# Patient Record
Sex: FEMALE | Race: OTHER | Employment: UNEMPLOYED | URBAN - METROPOLITAN AREA
[De-identification: names, ages, dates, MRNs, and addresses within clinical notes are randomized per-mention and may not be internally consistent; named-entity substitution may affect disease eponyms.]

---

## 2020-04-09 PROBLEM — Z34.90 ENCOUNTER FOR INDUCTION OF LABOR: Status: ACTIVE | Noted: 2020-04-09

## 2022-03-20 PROBLEM — Z34.90 ENCOUNTER FOR INDUCTION OF LABOR: Status: ACTIVE | Noted: 2020-04-09

## 2022-07-19 ENCOUNTER — HOSPITAL ENCOUNTER (OUTPATIENT)
Dept: BARIATRICS/WEIGHT MGMT | Age: 37
Discharge: HOME | End: 2022-07-19

## 2022-07-19 VITALS — HEIGHT: 64 IN | BODY MASS INDEX: 44.25 KG/M2 | WEIGHT: 259.2 LBS

## 2022-07-19 NOTE — PROGRESS NOTES
Medical Weight Loss Multi-Disciplinary Program    Patient's Name: Amanda Martinez   Age: 40 y.o. YOB: 1985   Sex: female    Session # 1. Pt attended in-person class. Weight obtained in office. Date: 7/19/2022    Visit Vitals  Ht 5' 4\" (1.626 m)   Wt 117.6 kg (259 lb 3.2 oz)   BMI 44.49 kg/m²       Pounds Lost since last month: N/A Pounds Gained since last month: N/A    Seminar Weight (6/27/22): 255.2 lbs  Overall Pounds Lost: 0.0 lbs   Overall Pounds Gained: 4 lbs    Do you smoke? no    Alcohol intake:  Number of drinks per week: 0    Class Guidelines    Guidelines are reviewed with patient at the start of every class. 1. Patient understands that weight loss trial classes must be consecutive. Patient understands if they miss a class, it is their responsibility to contact me to reschedule class. I will reach out to patient after their first no show. 2.  Patient understands the expectations that weight maintenance/weight loss is expected during the classes. Failure to demonstrate changes may result in extension of weight loss trial, followed by returning to see the surgeon. Patient understands that they CANNOT gain any weight during the weight loss trial.  Gaining weight will result in extension of weight loss trial.  3. Patient is also instructed to complete their labwork, psychological evaluation visit, and any other tests that the surgeon has used while they are working on their weight loss trial.  4.  Patient was instructed to bring their packet of nutrition education materials to every class and appointment. Other Pertinent Information    Changes Made in past month: Pt states that she is drinking less soda and more water. Patient's plan for dietary &/or behavior changes in the upcoming month: none noted. Eating Habits and Behaviors      Today in class we reviewed the Key Diet Principles. Patient was encouraged to consume 3 meals each day, and meal timing was reviewed. Meal time behaviors that will help pt to be successful with their weight loss efforts were also discussed. We discussed the importance of drinking adequate amounts of fluids, recommending that patient consume a minimum of 64 oz of sugar-free, caffeine-free and carbonation-free fluids each day. Patient was encouraged to eliminate sugar-sweetened beverages such as sweet tea, fruit juice, fruit smoothies, flavored coffee drinks and regular sodas. During the weight loss trial, patient was encouraged to focus on eating protein-forward meals, with a daily goal of  grams protein. Patient was also advised to decrease carbohydrate intake to <100 g/d, choosing complex vs. simple carbohydrates in limited amounts. We also discussed limiting fat intake. Encouraged patient to follow the \"3-gram rule\" when choosing foods by looking for items containing <3 g of fat and sugar per serving. We reviewed meal planning guidelines and discussed appropriate meal and snack options. We also talked about appropriate protein drinks and patient was encouraged to start trying these, using them either for a meal replacement or a protein-rich snack. We reviewed the nutritional guidelines for selecting protein shakes. Pre-operative vitamin and mineral supplementation was reviewed. Patient was instructed to choose a chewable complete multivitamin with iron in NON-gummy form. Selection of probiotic was also reviewed. Patient's current diet habits include:  Patient is eating 3 meals and 2 snacks per day. Patient is not measuring portions out. Patient indicates they are eating out 3 times per week. This includes fast food, carry out, and sit down restaurants. Per dietary recall, pts diet has the following concerns: Pt is consuming a large number of carbohydrates and simple sugars . Pt has found 0 protein supplement shakes for use post-op in meeting their protein needs.     Physical Activity/Exercise    Comments: Patient is currently walking  for activity 30-60 min/d x 5 d/wk. We talked about recommended types of physical activity, including walking, swimming, cycling, or chair exercises. I also talked with patient about doing some strength training, which helps the metabolism, as well as strengthen muscles and bones. Patient's plan to incorporate more activity includes: \"1 hr per day going to the gym, walking daily. \"      Behavior Modification     Comments:  During today's class, we discussed planning & prepping meals while on vacation as behavior change tool to improve eating behaviors, promote weight loss and long-term weight maintenance, encourage a healthy relationship with food, and prevent chronic disease. Patient was directed to the handout on \"Being Healthy on Vacation\", for guidelines to use while on vacation and as a reminder to limit or avoid Alcohol, keep splurges small, use protein drinks when needed for protein needs and to help stave off hunger while out and about. Additionally, discussed meal planning tips in relation to where the pt is staying and how they are travelling. Goals: Work to increase to 3-4 small meals per day, with 1-2 planned snacks as needed. Recommend following the plate method for meal planning - focusing on lean protein, non-starchy vegetables, and measured amounts of complex carbohydrates. - Goal of  g protein and <100 g carbohydrates per day. - Select at least 2 DIFFERENT protein supplements that can be used for protein supplementation to meet goals pre- and post-operatively. - Practice Carbohydrate Counting and label reading.   - Follow 3 g rule for fat and sugar. 2. Slow down meals  - Chew each bite 25-35 times. - Aim for 20-30 min/meal.  3. Increase non-caloric fluids to 64 oz per day.   Eliminate caffeine, added sugar, carbonation, and straws.               - Continue to work to decrease sugar sweetened beverages - goal of calorie-free beverages only. - Must eliminate caffeine prior to surgery and avoid for ~6-8 weeks. - Practice 30:30 rule,  food and fluid intake. 4. Start activity regimen, work to increase ADLs. 5. Start Complete MVI and probiotic at least 30 days pre-op. Candidate for surgery (per RD): PENDING, Pt scheduled for nutrition education on 8/25/2022.

## 2022-07-25 ENCOUNTER — OFFICE VISIT (OUTPATIENT)
Dept: SURGERY | Age: 37
End: 2022-07-25
Payer: COMMERCIAL

## 2022-07-25 ENCOUNTER — HOSPITAL ENCOUNTER (OUTPATIENT)
Dept: LAB | Age: 37
Discharge: HOME | End: 2022-07-25
Payer: COMMERCIAL

## 2022-07-25 ENCOUNTER — HOSPITAL ENCOUNTER (OUTPATIENT)
Dept: NON INVASIVE DIAGNOSTICS | Age: 37
Discharge: HOME | End: 2022-07-25
Payer: COMMERCIAL

## 2022-07-25 VITALS
HEIGHT: 64 IN | TEMPERATURE: 97.5 F | BODY MASS INDEX: 44.37 KG/M2 | OXYGEN SATURATION: 94 % | HEART RATE: 79 BPM | RESPIRATION RATE: 16 BRPM | SYSTOLIC BLOOD PRESSURE: 122 MMHG | DIASTOLIC BLOOD PRESSURE: 68 MMHG | WEIGHT: 259.9 LBS

## 2022-07-25 DIAGNOSIS — M54.32 SCIATICA OF LEFT SIDE: ICD-10-CM

## 2022-07-25 DIAGNOSIS — E66.01 MORBID OBESITY WITH BODY MASS INDEX (BMI) OF 40.0 TO 49.9 (HCC): ICD-10-CM

## 2022-07-25 DIAGNOSIS — Z01.818 PRE-OP EVALUATION: ICD-10-CM

## 2022-07-25 DIAGNOSIS — Z78.9 USES BIRTH CONTROL: ICD-10-CM

## 2022-07-25 DIAGNOSIS — E66.01 MORBID OBESITY (HCC): Primary | ICD-10-CM

## 2022-07-25 DIAGNOSIS — K30 FUNCTIONAL DYSPEPSIA: ICD-10-CM

## 2022-07-25 DIAGNOSIS — L70.9 ADULT ACNE: ICD-10-CM

## 2022-07-25 DIAGNOSIS — K43.9 ABDOMINAL WALL HERNIA: ICD-10-CM

## 2022-07-25 DIAGNOSIS — E66.01 MORBID OBESITY (HCC): ICD-10-CM

## 2022-07-25 DIAGNOSIS — Z87.891 SMOKING HISTORY: ICD-10-CM

## 2022-07-25 PROBLEM — Z34.90 ENCOUNTER FOR INDUCTION OF LABOR: Status: RESOLVED | Noted: 2020-04-09 | Resolved: 2022-07-25

## 2022-07-25 LAB
ATRIAL RATE: 79 BPM
CALCULATED P AXIS, ECG09: 35 DEGREES
CALCULATED R AXIS, ECG10: 6 DEGREES
CALCULATED T AXIS, ECG11: 53 DEGREES
DIAGNOSIS, 93000: NORMAL
P-R INTERVAL, ECG05: 148 MS
Q-T INTERVAL, ECG07: 396 MS
QRS DURATION, ECG06: 86 MS
QTC CALCULATION (BEZET), ECG08: 454 MS
T4 FREE SERPL-MCNC: 1.1 NG/DL (ref 0.7–1.5)
TSH SERPL DL<=0.05 MIU/L-ACNC: 2.45 UIU/ML (ref 0.36–3.74)
VENTRICULAR RATE, ECG03: 79 BPM

## 2022-07-25 PROCEDURE — 99204 OFFICE O/P NEW MOD 45 MIN: CPT | Performed by: SPECIALIST

## 2022-07-25 PROCEDURE — 36415 COLL VENOUS BLD VENIPUNCTURE: CPT

## 2022-07-25 PROCEDURE — 84443 ASSAY THYROID STIM HORMONE: CPT

## 2022-07-25 PROCEDURE — 84439 ASSAY OF FREE THYROXINE: CPT

## 2022-07-25 PROCEDURE — 93005 ELECTROCARDIOGRAM TRACING: CPT

## 2022-07-25 RX ORDER — SPIRONOLACTONE 100 MG/1
100 TABLET, FILM COATED ORAL DAILY
COMMUNITY
Start: 2022-07-05

## 2022-07-25 RX ORDER — KETOCONAZOLE 20 MG/ML
SHAMPOO TOPICAL
COMMUNITY
Start: 2022-07-05

## 2022-07-25 RX ORDER — NORETHINDRONE ACETATE AND ETHINYL ESTRADIOL, ETHINYL ESTRADIOL AND FERROUS FUMARATE 1MG-10(24)
1 KIT ORAL DAILY
COMMUNITY
Start: 2022-06-05

## 2022-07-25 NOTE — PROGRESS NOTES
Bariatric Surgery Consultation    Subjective: The patient is a 40 y.o. obese female with a Body mass index is 44.61 kg/m². .  The patient is currently her heaviest weight for the past several years. she has been overweight since her teen years. she has been considering surgery since last year. she desires surgery at this time because of multiple health concerns and their lifestyle issues which are hindered by their weight. she has been referred by Dr. Marce Paul for evaluation and treatment of their obesity via surgical intervention. Janes Ivy has tried multiple diets in her lifetime most recently tried behavior modification and unsupervised diets    Bariatric comorbidities present are   Patient Active Problem List   Diagnosis Code    Encounter for induction of labor Z34.90    Morbid obesity ([facility] [address] E66.01    Uses birth control Z78.9    Adult acne L70.9    Functional dyspepsia K30    Sciatica of left side M54.32    Abdominal wall hernia K43.9    Smoking history Z87.891    Morbid obesity with body mass index (BMI) of 40.0 to 49.9 ([facility]) E66.01       The patient is considering laparoscopic sleeve gastrectomy for surgical weight loss due to their ineffective progress with medical forms of weight loss and the urging of their physician who cares for their primary medical issues. The patient  now presents  for consideration for weight loss surgery understanding the benefits of this over a medical approach of weight loss as was discussed in our presentation on weight loss surgery. They have discussed their plans both with their family and primary care physician who is in support of their pursuit of such. The patient has had no health issues as of late and denies and gastrointestinal disturbances other than what is outlined below in their review of symptoms.  All of their prior evaluations available by both their PCP's and specialists physicians have been reviewed today either in the Care Everywhere portal or scanned under the media tab. I have spent a large portion of my initial consultation today reviewing the patients current dietary habits which have contributed to their health issues and obesity. I have suggested to them personally a dietary regimen that they can initiate now to help with their status as it pertains to their weight. They understand that the most important aspect of their journey through their weight loss endeavor will be their adherence to a new lifestyle of healthy eating behavior. They also understand that an adherence to an exercise program will not only help with weight loss but is ultimately important in weight maintenance. The patients goal weight is 157 lb. Patient Active Problem List    Diagnosis Date Noted    Morbid obesity ([facility] [address] 2022    Uses birth control 2022    Adult acne 2022    Functional dyspepsia 2022    Sciatica of left side 2022    Abdominal wall hernia 2022    Smoking history 2022    Morbid obesity with body mass index (BMI) of 40.0 to 49.9 ([address] 2022    Encounter for induction of labor 2020     Past Surgical History:   Procedure Laterality Date    HX LEEP PROCEDURE        Social History     Tobacco Use    Smoking status: Former     Types: Cigarettes     Quit date:      Years since quittin.5    Smokeless tobacco: Never   Substance Use Topics    Alcohol use: Yes     Comment: occasional glass of wine      Family History   Problem Relation Age of Onset    Hypertension Mother     No Known Problems Father     Diabetes Paternal Grandfather       Current Outpatient Medications   Medication Sig Dispense Refill    ketoconazole (NIZORAL) 2 % shampoo use as directed three times a week      Lo Loestrin Fe 1 mg-10 mcg (24)/10 mcg (2) tab Take 1 Tablet by mouth daily. spironolactone (ALDACTONE) 100 mg tablet Take 100 mg by mouth in the morning.       ibuprofen (MOTRIN) 800 mg tablet Take 1 Tab by mouth every eight (8) hours as needed for Pain. 30 Tab 0    multivitamin (ONE A DAY) tablet Take 1 Tab by mouth daily. cholecalciferol, vitamin D3, 50 mcg (2,000 unit) tab Take  by mouth daily. loratadine (CLARITIN) 10 mg tablet Take 10 mg by mouth daily.        No Known Allergies     Review of Systems:     General - No history or complaints of unexpected fever, chills, or weight loss  Head/Neck - No history or complaints of headache, diplopia, dysphagia, hearing loss  Cardiac - No history or complaints of chest pain, palpitations, murmur, or shortness of breath  Pulmonary - No history or complaints of shortness of breath, productive cough, hemoptysis  Gastrointestinal - (+) reflux, no abdominal pain, obstipation/constipation or blood per rectum  Genitourinary - No history or complaints of hematuria/dysuria, stress urinary incontinence symptoms, or renal lithiasis  Musculoskeletal - mild joint pain in their hips,  no muscular weakness  Hematologic - No history or complaints of bleeding disorders,  No blood transfusions  Neurologic - No history or complaints of  migraine headaches, seizure activity, syncopal episodes, TIA or stroke  Integumentary - No history or complaints of rashes, abnormal nevi, skin cancer  Gynecological - unremarkable    Objective:     Visit Vitals  /68 (BP 1 Location: Right upper arm, BP Patient Position: Sitting, BP Cuff Size: Large adult long)   Pulse 79   Temp 97.5 °F (36.4 °C)   Resp 16   Ht 5' 4\" (1.626 m)   Wt 117.9 kg (259 lb 14.4 oz)   SpO2 94%   BMI 44.61 kg/m²     Physical Examination: General appearance - alert, well appearing, and in no distress  Mental status - alert, oriented to person, place, and time  Eyes - pupils equal and reactive, extraocular eye movements intact  Nose - normal and patent, no erythema, discharge or polyps  Mouth - mucous membranes moist, pharynx normal without lesions  Neck - supple, no significant adenopathy  Lymphatics - no palpable lymphadenopathy, no hepatosplenomegaly  Chest - clear to auscultation, no wheezes, rales or rhonchi, symmetric air entry  Heart - normal rate, regular rhythm, normal S1, S2, no murmurs, rubs, clicks or gallops  Abdomen - soft, nontender, nondistended, no masses or organomegaly,   moderately large umbilical hernia slightly asymmetric with bulge to the right  Back exam - full range of motion, no tenderness, palpable spasm or pain on motion  Neurological - alert, oriented, normal speech, no focal findings or movement disorder noted  Musculoskeletal - no joint tenderness, deformity or swelling  Extremities - peripheral pulses normal, no pedal edema, no clubbing or cyanosis  Skin - normal coloration and turgor, no rashes, no suspicious skin lesions noted    Labs:       Recent Results (from the past 1440 hour(s))   CBC WITH AUTOMATED DIFF    Collection Time: 06/29/22 12:19 PM   Result Value Ref Range    WBC 6.6 4.0 - 11.0 1000/mm3    RBC 4.36 3.60 - 5.20 M/uL    HGB 13.6 13.0 - 17.2 gm/dl    HCT 41.8 37.0 - 50.0 %    MCV 95.9 80.0 - 98.0 fL    MCH 31.2 25.4 - 34.6 pg    MCHC 32.5 30.0 - 36.0 gm/dl    PLATELET 563 762 - 380 1000/mm3    MPV 10.9 (H) 6.0 - 10.0 fL    RDW-SD 41.4 36.4 - 46.3      NRBC 0 0 - 0      IMMATURE GRANULOCYTES 0.3 0.0 - 3.0 %    NEUTROPHILS 53.2 34 - 64 %    LYMPHOCYTES 32.2 28 - 48 %    MONOCYTES 7.6 1 - 13 %    EOSINOPHILS 5.9 (H) 0 - 5 %    BASOPHILS 0.8 0 - 3 %   METABOLIC PANEL, COMPREHENSIVE    Collection Time: 06/29/22 12:19 PM   Result Value Ref Range    Potassium 4.0 3.4 - 4.5 mEq/L    Chloride 102 98 - 107 mEq/L    Sodium 138 136 - 145 mEq/L    CO2 27 20 - 31 mEq/L    Glucose 85 74 - 106 mg/dl    BUN 10 9 - 23 mg/dl    Creatinine 0.62 0.55 - 1.02 mg/dl    GFR est AA >60.0      GFR est non-AA >60      Calcium 9.1 8.7 - 10.4 mg/dl    Anion gap 9 5 - 15 mmol/L    AST (SGOT) 11.0 0.0 - 33.9 U/L    ALT (SGPT) 11 10 - 49 U/L    Alk.  phosphatase 74 46 - 116 U/L    Bilirubin, total 0.40 0.30 - 1.20 mg/dl    Protein, total 7.3 5.7 - 8.2 gm/dl    Albumin 3.9 3.4 - 5.0 gm/dl   LIPID PANEL    Collection Time: 06/29/22 12:19 PM   Result Value Ref Range    Cholesterol, total 144 0 - 199 mg/dl    HDL Cholesterol 41 40 - 60 mg/dl    Triglyceride 83 0 - 150 mg/dl    LDL, calculated 86 0 - 130 mg/dl    CHOL/HDL Ratio 3.5 0.0 - 4.4 Ratio   URINALYSIS W/ RFLX MICROSCOPIC    Collection Time: 06/29/22 12:19 PM   Result Value Ref Range    Color YELLOW YELLOW,STRAW      Appearance SLIGHTLY CLOUDY (A) CLEAR      Glucose NEGATIVE NEGATIVE,Negative mg/dl    Bilirubin NEGATIVE NEGATIVE,Negative      Ketone 15 (A) NEGATIVE,Negative mg/dl    Specific gravity 1.010 1.005 - 1.030      Blood MODERATE (A) NEGATIVE,Negative      pH (UA) 6.5 5.0 - 9.0      Protein NEGATIVE NEGATIVE,Negative mg/dl    Urobilinogen 1.0 0.0 - 1.0 mg/dl    Nitrites NEGATIVE NEGATIVE,Negative      Leukocyte Esterase MODERATE (A) NEGATIVE,Negative     POC URINE MICROSCOPIC    Collection Time: 06/29/22 12:19 PM   Result Value Ref Range    Epithelial cells, squamous OCCASIONAL /LPF    WBC 15-29 /HPF       Assessment:     Morbid obesity with comorbidity    Plan:     laparoscopic sleeve gastrectomy    This is a 40 y.o. female with a BMI of Body mass index is 44.61 kg/m². and the weight-related co-morbidties as noted below. Sarah meets the NIH criteria for bariatric surgery based upon the BMI of Body mass index is 44.61 kg/m². and multiple weight-related co-morbidties. Rui Iniguez has elected laparoscopic sleeve gastrectomy as her intervention of choice for treatment of morbid obestiy through surgical means secondary to its safety profile, rapid return to work  and decreases in operative risks over gastric bypass. In the office today, following Sarah's history and physical examination, a 30 minute discussion regarding the anatomic alterations for the laparoscopic sleeve gastrectomy was undertaken.  The dietary expectations and the patient and physician dependent factors for success were thoroughly discussed, to include the need for interval follow-up and long-term dietary changes associated with success. The possible complications of the sleeve gastrectomy  were also discussed, to include;death, DVT/PE, staple line leak, bleeding, stricture formation, infection, nutritional deficiencies and sleeve dilation. Specific weight related outcomes for success were also discussed with an emphasis on careful and close follow-up with the first year and eating behavior modification as the baseline and cyclical hunger return. The patient expressed an understanding of the above factors, and her questions were answered in their entirety. In addition, the patient attended a 1.5 hour power point seminar regarding obesity, surgical weight loss including, adjustable gastric band, gastric bypass, and sleeve gastrectomy. This discussion contrasted the different surgical techniques, mechanisms of actions and expected outcomes, and surgical and medical risks associated with each procedure. During this seminar, there was a long question and answer session where each questions was answered until there were no additional questions. Today, the patient had all of her questions answered and desires to proceed with  bariatric surgery initially choosing sleeve gastrectomy as her surgical option. Partial consult labs and EKG ordered today    Secondary Diagnoses:     Dietary Intervention  - The patient is currently scheduled to see or has been followed by a bariatric nutritionist for an attempt at preoperative weight loss as has been dictated by their insurance carrier. They will be assessed at various times during their follow up to evaluate their progress depending on the length of time that is required once again by their carrier.   I have explained the importance of preoperative weight loss and the benefits regarding lower surgical risk and also assisting the patient in reaching their weight loss goal. Finally they understand their is a physiologic benefit from the standpoint of hepatic volume reduction preoperatively. I have reiterated the importance of a low carbohydrate and high protein regimen to achieve their stated goal.     GERD -The patient understands that weight loss surgery is not a guaranteed cure for reflux disease but does understand the benefits that weight loss can have on reflux disease. They also understand that at the time of surgery the gastroesophageal junction will be evaluated for the presence of a diaphragmatic hernia. Hernias will be corrected always with the gastric band and sleeve gastrectomy procedures, but only on a case by case basis with the gastric bypass if it prevents our ability to perform the operation at hand, or if I feel that they would benefit long term with correction of this issue. The patient also understands that neither weight loss surgery nor repair of a diaphragmatic hernia repair guarantees the complete cessation of the disease. They also understand there is a possibility of recurrence with a simple crural repair as is performed with these procedures. They understand they may have to continue their medications in the postoperative period. They have a good understanding that the gastric bypass procedure is better suited to total resolution of this issue and that neither the Lap Band nor sleeve gastrectomy is considered a curative procedure as it pertains to this diagnosis.     Signed By: León Frederick MD     July 25, 2022

## 2022-09-14 ENCOUNTER — APPOINTMENT (OUTPATIENT)
Dept: GENERAL RADIOLOGY | Age: 37
End: 2022-09-14
Attending: SPECIALIST
Payer: COMMERCIAL

## 2022-09-14 ENCOUNTER — HOSPITAL ENCOUNTER (OUTPATIENT)
Age: 37
Setting detail: OUTPATIENT SURGERY
Discharge: HOME | End: 2022-09-14
Attending: SPECIALIST | Admitting: SPECIALIST
Payer: COMMERCIAL

## 2022-09-14 ENCOUNTER — APPOINTMENT (OUTPATIENT)
Dept: SURGERY | Age: 37
End: 2022-09-14

## 2022-09-14 VITALS
BODY MASS INDEX: 42.53 KG/M2 | RESPIRATION RATE: 17 BRPM | HEIGHT: 64 IN | OXYGEN SATURATION: 98 % | SYSTOLIC BLOOD PRESSURE: 124 MMHG | TEMPERATURE: 98.4 F | WEIGHT: 249.1 LBS | DIASTOLIC BLOOD PRESSURE: 79 MMHG | HEART RATE: 76 BPM

## 2022-09-14 DIAGNOSIS — K30 FUNCTIONAL DYSPEPSIA: Primary | ICD-10-CM

## 2022-09-14 DIAGNOSIS — E66.01 MORBID OBESITY (HCC): ICD-10-CM

## 2022-09-14 PROCEDURE — 74240 X-RAY XM UPR GI TRC 1CNTRST: CPT | Performed by: SPECIALIST

## 2022-09-14 PROCEDURE — 74240 X-RAY XM UPR GI TRC 1CNTRST: CPT

## 2022-09-14 PROCEDURE — 76040000019: Performed by: SPECIALIST

## 2022-09-14 PROCEDURE — 74011000250 HC RX REV CODE- 250: Performed by: SPECIALIST

## 2022-09-14 NOTE — PROCEDURES
Patient:Sarah Whitlock   : 1985  Medical Record [id number]      PREPROCEDURE DIAGNOSIS: This patient is preoperative for laparoscopic sleeve gastrectomy procedure with a history of  reflux disease. POSTPROCEDURE DIAGNOSIS: This patient is preoperative for laparoscopic sleeve gastrectomy procedure with a history of  reflux disease. PROCEDURES PERFORMED: Upper GI study with barium. ESTIMATED BLOOD LOSS: None. SPECIMENS: None. STATEMENT OF MEDICAL NECESSITY: The patient is a patient with a  longstanding history of obesity. They are now considering the laparoscopic sleeve gastrectomy procedure as a means of surgical weight control and due to their history of reflux disease and are being assessed preoperatively for such. DESCRIPTION OF PROCEDURE: The patient was brought to the fluoroscopy unit and  was given thin barium. On swallowing of barium, they were noted to have  normal peristalsis of their esophagus. They had prompt filling of distal  esophagus with tapering into the gastroesophageal junction. There was no evidence of a hiatal hernia present. Contrast then filled the gastric cardia, fundus,body and pre pyloric region with no abnormalities noted. Contrast then exited the pylorus in normal fashion. No obstruction was noted. There was no evidence of reflux noted.     (normal anatomy)    Julian Frias MD

## 2022-09-21 ENCOUNTER — HOSPITAL ENCOUNTER (OUTPATIENT)
Dept: BARIATRICS/WEIGHT MGMT | Age: 37
Discharge: HOME | End: 2022-09-21

## 2022-09-21 VITALS — BODY MASS INDEX: 43.26 KG/M2 | WEIGHT: 253.4 LBS | HEIGHT: 64 IN

## 2022-09-21 NOTE — PROGRESS NOTES
Medical Weight Loss Multi-Disciplinary Program    Patient's Name: Luke Rowe Age: 40 y.o. YOB: 1985 Sex: female     Session #1 (RESTART). Pt attended in-person class. Weight obtained in office. Date: 9/21/2022    Visit Vitals  Ht 5' 4\" (1.626 m)   Wt 114.9 kg (253 lb 6.4 oz)   BMI 43.50 kg/m²       Pounds Lost since last month: N/A Pounds Gained since last month: N/A       Starting Weight (Initial consult - 7/25/22: 259.9 lbs Previous Months Weight: N/A   Overall Pounds Lost: 6.5 lbs  Overall Pounds Gained: 0 lbs      Do you smoke? No    Alcohol intake:  Number of drinks per week: \"occasionally\"    Class Guidelines    Guidelines are reviewed with patient at the start of every class. 1. Patient understands that weight loss trial classes must be consecutive. Patient understands if they miss a class, it is their responsibility to contact me to reschedule class. I will reach out to patient after their first no show. 2.  Patient understands the expectations that weight maintenance/weight loss is expected during the classes. Failure to demonstrate changes may result in extension of weight loss trial, followed by returning to see the surgeon. Patient understands that they CANNOT gain any weight during the weight loss trial.  Gaining weight will result in extension of weight loss trial.  3. Patient is also instructed to complete their labwork, psychological evaluation visit, and any other tests that the surgeon has ordered while they are working on their weight loss trial.  4.  Patient was instructed to bring their packet of nutrition education materials to every class and appointment. Eating Habits and Behaviors    Reviewed intake  Understanding low carbohydrates, low sugar, higher protein meals  Instruction given for personal dietary changes  Discussed perceived compliance    Comments: RD Reviewed Diet History and Physical Activity/Exercise habits.   Recommended dietary changes discussed for both before and after surgery. Today in class we reviewed the Key Diet Principles. Patient was encouraged to consume 3 meals each day, and meal timing was reviewed. Meal time behaviors that will help pt to be successful with their weight loss efforts were also discussed. We discussed the importance of drinking adequate amounts of fluids, recommending that patient consume a minimum of 64 oz of sugar-free, caffeine-free and carbonation-free fluids each day. Patient was encouraged to eliminate sugar-sweetened beverages such as sweet tea, fruit juice, fruit smoothies, flavored coffee drinks and regular sodas. During the weight loss trial, patient is encouraged to focus on eating protein-forward meals, with a daily goal of  grams protein. Patient was also advised to decrease carbohydrate intake to <100 g/d, choosing complex vs. simple carbohydrates in limited amounts. We also discussed limiting fat intake. Encouraged patient to follow the \"3-gram rule\" when choosing foods by looking for items containing <3 g of fat and sugar per serving. We reviewed meal planning guidelines and discussed appropriate meal and snack options. We also talked about appropriate protein drinks and patient was encouraged to start trying these, using them either for a meal replacement or a protein-rich snack pre-operatively. We reviewed the nutritional guidelines for selecting protein shakes. Pre-operative vitamin and mineral supplementation was reviewed. Patient was instructed to choose a chewable complete multivitamin with iron in NON-gummy form. Selection of probiotic was also reviewed. Comments: During today's class we talked about the role of carbohydrates. Patient was instructed on: The function of carbohydrates. Foods that are carbohydrate-heavy. Patient was given the guidelines to keep their carbohydrates less than 50-75 grams per day in the pre-op phase.   Patient was also given ideas of low carb swaps, which include zucchini noodles, spaghetti squash, or cauliflower rice. Discussed lower carb swaps to use instead of potato chips. Patient's current diet habits include:  Patient is eating 3 meals and 1-2 snacks per day. Per dietary recall, pts diet has the following concerns:  Pt is consuming foods high in carbohydrates, such as: fruit, rice or other starches in TV dinners  Pt is consuming 8 oz sugar-sweetened beverages/day. Pt has found 0 protein supplement shakes for use post-op in meeting their protein needs. Physical Activity/Exercise    Comments: We talked about exercise. Patient was given reasons of why exercise is so important and how that can help with their long-term success. I have encouraged patient to get a support system to help with the activity. We talked about recommended types of physical activity, including walking, swimming, cycling, or chair exercises. I also talked with patient about doing some strength training, which helps the metabolism, as well as strengthen muscles and bones. Patient's plan to incorporate more activity includes: \"More walking. I'm also in physical therapy 2x/wk. \"      Behavior Modification     Comments:  During today's class, we discussed the benefits of regular physical activity. Specific guidelines for cardiovascular exercise and resistance/strength training were reviewed, as well as appropriate types of physical activity. Patient was directed to the handout, \"Overcoming Barriers to Exercise\", where we reviewed the importance of making physical activity part of a healthy lifestyle rather than just a way of meeting a weight loss goal.  We also discussed  for specific tips on fitting physical activity in when patients feel they are too busy to exercise and during inclement weather, as well as ideas for low/no cost exercise, and ways to exercise despite physical limitations.   Patient was instructed to discuss any physical limitations with their healthcare provider. A list of ways to facilitate regular physical activity was reviewed, as well as how to get started with a regular physical activity regimen. Goals: Work to increase to 3-4 small meals per day, with 1-2 planned snacks as needed. Recommend following the plate method for meal planning - focusing on lean protein, non-starchy vegetables, and measured amounts of complex carbohydrates. - Goal of  g protein and <100 g carbohydrates per day. - Select at least 2 DIFFERENT protein supplements that can be used for protein supplementation to meet goals pre- and post-operatively. - Practice Carbohydrate Counting and label reading.   - Follow 3 g rule for fat and sugar. 2. Slow down meals  - Chew each bite 25-35 times. - Aim for 20-30 min/meal.  3. Increase non-caloric fluids to 64 oz per day. Eliminate caffeine, added sugar, carbonation, and straws.               - Continue to work to decrease sugar sweetened beverages - goal of calorie-free beverages only. - Must eliminate caffeine prior to surgery and avoid for ~6-8 weeks. - Practice 30:30 rule,  food and fluid intake. 4. Start activity regimen, work to increase ADLs. 5. Start Complete MVI and probiotic at least 30 days pre-op. Candidate for surgery (per RD): PENDING, Pt scheduled for nutrition education on 10/25/22.

## 2022-10-25 ENCOUNTER — HOSPITAL ENCOUNTER (OUTPATIENT)
Dept: BARIATRICS/WEIGHT MGMT | Age: 37
Discharge: HOME | End: 2022-10-25

## 2022-10-25 VITALS — WEIGHT: 254.5 LBS | HEIGHT: 64 IN | BODY MASS INDEX: 43.45 KG/M2

## 2022-10-25 NOTE — PROGRESS NOTES
Medical Weight Loss Multi-Disciplinary Program    Patient's Name: Jolanta Ansari Age: 40 y.o. YOB: 1985 Sex: female     Session #2. Pt attended in-person class. Weight obtained in office. Date: 10/25/2022    Visit Vitals  Ht 5' 4\" (1.626 m)   Wt 115.4 kg (254 lb 8 oz)   BMI 43.68 kg/m²       Pounds Lost since last month: 0.0 lbs Pounds Gained since last month: 1.1 lbs       Starting Weight: 259.9 lbs Previous Months Weight: 253.4 lbs   Overall Pounds Lost: 5.4 lbs  Overall Pounds Gained: 0.0 lbs     Do you smoke? no    Alcohol intake:  Number of drinks per week: 1    Class Guidelines    Guidelines are reviewed with patient at the start of every class. 1. Patient understands that weight loss trial classes must be consecutive. Patient understands if they miss a class, it is their responsibility to contact me to reschedule class. I will reach out to patient after their first no show. 2.  Patient understands the expectations that weight maintenance/weight loss is expected during the classes. Failure to demonstrate changes may result in extension of weight loss trial, followed by returning to see the surgeon. Patient understands that they CANNOT gain any weight during the weight loss trial.  Gaining weight will result in extension of weight loss trial.  3. Patient is also instructed to complete their labwork, psychological evaluation visit, and any other tests that the surgeon has ordered while they are working on their weight loss trial.  4.  Patient was instructed to bring their packet of nutrition education materials to every class and appointment. Eating Habits and Behaviors    Reviewed intake  Understanding low-carbohydrate/low-sugar/low-fat, higher protein meals  Instruction given for personal dietary changes  Discussed perceived compliance    Comments: RD Reviewed Diet History and Physical Activity/Exercise habits.   Recommended dietary changes discussed for both before and after surgery. Today in class we reviewed the Key Diet Principles. Patient was encouraged to consume 3 meals each day, and meal timing was reviewed. Meal time behaviors that will help pt to be successful with their weight loss efforts were also discussed. We discussed the importance of drinking adequate amounts of fluids, recommending that patient consume a minimum of 64 oz of sugar-free, caffeine-free and carbonation-free fluids each day. Patient was encouraged to eliminate sugar-sweetened beverages such as sweet tea, fruit juice, fruit smoothies, flavored coffee drinks and regular sodas. During the weight loss trial, patient is encouraged to focus on eating protein-forward meals, with a daily goal of  grams protein. Patient was also advised to decrease carbohydrate intake to <100 g/d, choosing complex vs. simple carbohydrates in limited amounts. We also discussed limiting fat intake. Encouraged patient to follow the \"3-gram rule\" when choosing foods by looking for items containing <3 g of fat and sugar per serving. We reviewed meal planning guidelines and discussed appropriate meal and snack options. We also talked about appropriate protein drinks and patient was encouraged to start trying these, using them either for a meal replacement or a protein-rich snack pre-operatively. We reviewed the nutritional guidelines for selecting protein shakes. Pre-operative vitamin and mineral supplementation was reviewed. Patient was instructed to choose a chewable complete multivitamin with iron in NON-gummy form. Selection of probiotic was also reviewed. We also talked about dining out after bariatric surgery. Patient was instructed on:   Following the Key Diet Principles to stay within the bariatric dietary guidelines  Key words to look for in menu item descriptions in order to make healthier choices  Requesting specific substitutions to allow meals to fit in with bariatric dietary guidelines  Using the restaurant card to request smaller portions of menu items or ordering from children's/senior's menu    Patient's current diet habits include:  Patient is eating 3 meals and 2 snacks per day. Per dietary recall, pts diet has the following concerns:  Pt is consuming 1 alcoholic drinks per week. Pt is only consuming 40-60 oz water/day. Pt is consuming 16 oz sugar-sweetened beverages/day. Pt is consuming foods high in carbohydrates, such as: sugar-sweetened fluids, rice, skinny bagels, oatmeal, \"protein bars\", and fruit juice. Pt is consuming foods high in fat such as: nuts, and clark. Pt is consuming caffeinated beverages in the form of: soda, coffee, and tea. Pt is consuming \"detox tea\", with unknown herbal supplements. Pt has found 0 protein supplement shakes for use post-op in meeting their protein needs. Patient's plan for dietary and/or behavior changes in the upcoming month: none noted. Physical Activity/Exercise    Comments: We talked about exercise. Patient was given reasons of why exercise is so important and how that can help with their long-term success. I have encouraged patient to get a support system to help with the activity. We talked about recommended types of physical activity, including walking, swimming, cycling, or chair exercises. I also talked with patient about doing some strength training, which helps the metabolism, as well as strengthen muscles and bones. Patient's plan to incorporate more activity includes: \"I hope to be able to take longer walks\". Behavior Modification     Comments:  During today's class, we discussed important behavior changes relating to dining away from home that will help the patient be successful in meeting their weight loss goals and improving their health including:   The need to limit the frequency of dining away from home, especially fast food and convenience food options, in order to limit intake of calories, fats, and carbohydrates  The importance of keeping a positive mindset by focusing on the occasion and enjoying the company rather than the foods they can't have and the need for reduced portion sizes after surgery  Avoiding starters such as appetizers, breads, bar snacks, soups, and salads  Researching to find restaurants that offer healthier food options and reviewing menus/nutrition facts online to have a plan of what to order before dining out    Goals: Work to increase to 3-4 small meals per day, with 1-2 planned snacks as needed. Recommend following the plate method for meal planning - focusing on lean protein, non-starchy vegetables, and measured amounts of complex carbohydrates. - Goal of  g protein and <100 g carbohydrates per day. - Select at least 2 DIFFERENT protein supplements that can be used for protein supplementation to meet goals pre- and post-operatively. - Practice Carbohydrate Counting and label reading.   - Follow 3 g rule for fat and sugar. 2. Slow down meals  - Chew each bite 25-35 times. - Aim for 20-30 min/meal.  3. Increase non-caloric fluids to 64 oz per day. Eliminate caffeine, added sugar, carbonation, and straws.               - Continue to work to decrease sugar sweetened beverages - goal of calorie-free beverages only. - Must eliminate caffeine prior to surgery and avoid for ~6-8 weeks. - Practice 30:30 rule,  food and fluid intake. 4. Start activity regimen, work to increase ADLs. 5. Start Complete MVI and probiotic at least 30 days pre-op. Candidate for surgery (per RD): PENDING, Pt scheduled for nutrition education on 11/15/2022.

## 2022-11-15 ENCOUNTER — HOSPITAL ENCOUNTER (OUTPATIENT)
Dept: BARIATRICS/WEIGHT MGMT | Age: 37
Discharge: HOME | End: 2022-11-15

## 2022-11-15 VITALS — BODY MASS INDEX: 41.69 KG/M2 | WEIGHT: 244.2 LBS | HEIGHT: 64 IN

## 2022-11-15 NOTE — PROGRESS NOTES
Medical Weight Loss Multi-Disciplinary Program    Patient's Name: Simona Carty Age: 40 y.o. YOB: 1985 Sex: female     Session #3. Pt attended in-person class. Weight obtained in office. Date: 11/15/2022    Visit Vitals  Ht 5' 4\" (1.626 m)   Wt 110.8 kg (244 lb 3.2 oz)   BMI 41.92 kg/m²       Pounds Lost since last month: 10.3 lbs Pounds Gained since last month: 0.0 lbs       Starting Weight: 259.9 lbs Previous Months Weight: 254.5 lbs   Overall Pounds Lost: 15.7 lbs  Overall Pounds Gained: 0.0 lbs     Do you smoke? no    Alcohol intake:  Number of drinks per week:  0    Class Guidelines    Guidelines are reviewed with patient at the start of every class. 1. Patient understands that weight loss trial classes must be consecutive. Patient understands if they miss a class, it is their responsibility to contact me to reschedule class. I will reach out to patient after their first no show. 2.  Patient understands the expectations that weight maintenance/weight loss is expected during the classes. Failure to demonstrate changes may result in extension of weight loss trial, followed by returning to see the surgeon. Patient understands that they CANNOT gain any weight during the weight loss trial.  Gaining weight will result in extension of weight loss trial.  3. Patient is also instructed to complete their labwork, psychological evaluation visit, and any other tests that the surgeon has ordered while they are working on their weight loss trial.  4.  Patient was instructed to bring their packet of nutrition education materials to every class and appointment. Eating Habits and Behaviors    Reviewed intake  Understanding low carbohydrates, low sugar, higher protein meals  Instruction given for personal dietary changes  Discussed perceived compliance    Comments: RD Reviewed Diet History and Physical Activity/Exercise habits.   Recommended dietary changes discussed for both before and after surgery. Today in class we reviewed the Key Diet Principles. Patient was encouraged to consume 3 meals each day, and meal timing was reviewed. Meal time behaviors that will help pt to be successful with their weight loss efforts were also discussed. We discussed the importance of drinking adequate amounts of fluids, recommending that patient consume a minimum of 64 oz of sugar-free, caffeine-free and carbonation-free fluids each day. Patient was encouraged to eliminate sugar-sweetened beverages such as sweet tea, fruit juice, fruit smoothies, flavored coffee drinks and regular sodas. During the weight loss trial, patient is encouraged to focus on eating protein-forward meals, with a daily goal of  grams protein. Patient was also advised to decrease carbohydrate intake to <100 g/d, choosing complex vs. simple carbohydrates in limited amounts. We also discussed limiting fat intake. Encouraged patient to follow the \"3-gram rule\" when choosing foods by looking for items containing <3 g of fat and sugar per serving. We reviewed meal planning guidelines and discussed appropriate meal and snack options. We also talked about appropriate protein drinks and patient was encouraged to start trying these, using them either for a meal replacement or a protein-rich snack pre-operatively. We reviewed the nutritional guidelines for selecting protein shakes. Pre-operative vitamin and mineral supplementation was reviewed. Patient was instructed to choose a chewable complete multivitamin with iron in NON-gummy form. Selection of probiotic was also reviewed. Comments: During today's class we talked about the role of carbohydrates. Patient was instructed on: The function of carbohydrates. Foods that are carbohydrate-heavy. Patient was given the guidelines to keep their carbohydrates less than 50-75 grams per day in the pre-op phase.   Patient was also given ideas of low carb swaps, which include zucchini noodles, spaghetti squash, or cauliflower rice. Discussed lower carb swaps to use instead of potato chips. Patient's current diet habits include:  Patient is eating 3 meals and 1 snacks per day. Per dietary recall, pts diet has the following concerns:  Pt is consuming sugar sweetened foods 2 d/wk. Pt is consuming the following sugar-sweetened beverages/day: coffee, tea and fruit juice. Pt is consuming caffeinated beverages in the form of: tea and coffee. Pt is consuming foods high in carbohydrates, such as: fruit and bread. Pt has found 1 protein supplement shakes for use post-op in meeting their protein needs. Patient's plan for dietary and/or behavior changes in the upcoming month: none noted. Physical Activity/Exercise    Comments: We talked about exercise. Patient was given reasons of why exercise is so important and how that can help with their long-term success. I have encouraged patient to get a support system to help with the activity. We talked about recommended types of physical activity, including walking, swimming, cycling, or chair exercises. I also talked with patient about doing some strength training, which helps the metabolism, as well as strengthen muscles and bones. Patient's plan to incorporate more activity includes: \"Increasing my level of exercise; walking more frequently throughout the day\". Behavior Modification     Comments:  During today's class, I discussed vitamin and mineral supplementation essential for health and prevention of malnutrition in depth. Patient was directed to the handouts on vitamins and minerals found on pages 27-33 that were provided in class handouts packet as well as a handout titled, \"Nutrient Deficiency and it's symptoms\". I reviewed the vitamins and minerals that need to be supplemented, dosage recommendations, functions of supplements and signs of deficiencies, as well as examples of specific supplements. Reviewed briefly the requirement  differences between laparoscopic gastric bypass, laparoscopic sleeve gastrectomy, and lap-band procedures, while encouraging all patients to continue supplements for life no matter what bariatric surgery they are preparing for. Goals: Work to increase to 3-4 small meals per day, with 1-2 planned snacks as needed. Recommend following the plate method for meal planning - focusing on lean protein, non-starchy vegetables, and measured amounts of complex carbohydrates. - Goal of  g protein and <100 g carbohydrates per day. - Select at least 2 DIFFERENT protein supplements that can be used for protein supplementation to meet goals pre- and post-operatively. - Practice Carbohydrate Counting and label reading.   - Follow 3 g rule for fat and sugar. 2. Slow down meals  - Chew each bite 25-35 times. - Aim for 20-30 min/meal.  3. Increase non-caloric fluids to 64 oz per day. Eliminate caffeine, added sugar, carbonation, and straws.               - Continue to work to decrease sugar sweetened beverages - goal of calorie-free beverages only. - Must eliminate caffeine prior to surgery and avoid for ~6-8 weeks. - Practice 30:30 rule,  food and fluid intake. 4. Start activity regimen, work to increase ADLs. 5. Start Complete MVI and probiotic at least 30 days pre-op. Candidate for surgery (per RD): PENDING, Pt scheduled for nutrition education on 12/8/2022.

## 2022-12-08 ENCOUNTER — HOSPITAL ENCOUNTER (OUTPATIENT)
Dept: BARIATRICS/WEIGHT MGMT | Age: 37
Discharge: HOME | End: 2022-12-08

## 2023-01-19 ENCOUNTER — HOSPITAL ENCOUNTER (OUTPATIENT)
Dept: BARIATRICS/WEIGHT MGMT | Age: 38
Discharge: HOME | End: 2023-01-19

## 2023-01-19 VITALS — WEIGHT: 234.2 LBS | HEIGHT: 64 IN | BODY MASS INDEX: 39.98 KG/M2

## 2023-01-19 NOTE — PROGRESS NOTES
Medical Weight Loss Multi-Disciplinary Program    Patient's Name: Shila Real Age: 40 y.o. YOB: 1985 Sex: female     Session #{NUMBERS 1-12:82896}. Pt attended in-person class. Weight obtained in office. Date: 1/19/2023    Visit Vitals  Ht 5' 4\" (1.626 m)   Wt 106.2 kg (234 lb 3.2 oz)   BMI 40.20 kg/m²       Pounds Lost since last month: *** lbs Pounds Gained since last month: *** lbs       Starting Weight: *** lbs Previous Months Weight: *** lbs   Overall Pounds Lost: *** lbs  Overall Pounds Gained: *** lbs     Do you smoke? ***    Alcohol intake:  Number of drinks per week: ***    Class Guidelines    Guidelines are reviewed with patient at the start of every class. 1. Patient understands that weight loss trial classes must be consecutive. Patient understands if they miss a class, it is their responsibility to contact me to reschedule class. I will reach out to patient after their first no show. 2.  Patient understands the expectations that weight maintenance/weight loss is expected during the classes. Failure to demonstrate changes may result in extension of weight loss trial, followed by returning to see the surgeon. Patient understands that they CANNOT gain any weight during the weight loss trial.  Gaining weight will result in extension of weight loss trial.  3. Patient is also instructed to complete their labwork, psychological evaluation visit, and any other tests that the surgeon has ordered while they are working on their weight loss trial.  4.  Patient was instructed to bring their packet of nutrition education materials to every class and appointment. Eating Habits and Behaviors    Reviewed intake  Understanding low-carbohydrate/low-sugar/low-fat, higher protein meals  Instruction given for personal dietary changes  Discussed perceived compliance    Comments: RD Reviewed Diet History and Physical Activity/Exercise habits.   Recommended dietary changes discussed for both before and after surgery. Today in class we reviewed the Key Diet Principles. Patient was encouraged to consume 3 meals each day, and meal timing was reviewed. Meal time behaviors that will help pt to be successful with their weight loss efforts were also discussed. We discussed the importance of drinking adequate amounts of fluids, recommending that patient consume a minimum of 64 oz of sugar-free, caffeine-free and carbonation-free fluids each day. Patient was encouraged to eliminate sugar-sweetened beverages such as sweet tea, fruit juice, fruit smoothies, flavored coffee drinks and regular sodas. During the weight loss trial, patient is encouraged to focus on eating protein-forward meals, with a daily goal of  grams protein. Patient was also advised to decrease carbohydrate intake to <100 g/d, choosing complex vs. simple carbohydrates in limited amounts. We also discussed limiting fat intake. Encouraged patient to follow the \"3-gram rule\" when choosing foods by looking for items containing <3 g of fat and sugar per serving. We reviewed meal planning guidelines and discussed appropriate meal and snack options. We also talked about appropriate protein drinks and patient was encouraged to start trying these, using them either for a meal replacement or a protein-rich snack pre-operatively. We reviewed the nutritional guidelines for selecting protein shakes. Pre-operative vitamin and mineral supplementation was reviewed. Patient was instructed to choose a chewable complete multivitamin with iron in NON-gummy form. Selection of probiotic was also reviewed. We also talked about dining out after bariatric surgery. Patient was instructed on:   Following the Key Diet Principles to stay within the bariatric dietary guidelines  Key words to look for in menu item descriptions in order to make healthier choices  Requesting specific substitutions to allow meals to fit in with bariatric dietary guidelines  Using the restaurant card to request smaller portions of menu items or ordering from children's/senior's menu    Patient's current diet habits include:  Patient is eating *** meals and *** snacks per day. Per dietary recall, pts diet has the following concerns:  Pt has found *** protein supplement shakes for use post-op in meeting their protein needs. ***     Patient's plan for dietary and/or behavior changes in the upcoming month: ***    Physical Activity/Exercise    Comments: We talked about exercise. Patient was given reasons of why exercise is so important and how that can help with their long-term success. I have encouraged patient to get a support system to help with the activity. We talked about recommended types of physical activity, including walking, swimming, cycling, or chair exercises. I also talked with patient about doing some strength training, which helps the metabolism, as well as strengthen muscles and bones. Patient's plan to incorporate more activity includes: ***      Behavior Modification     Comments:  During today's class, we discussed important behavior changes relating to dining away from home that will help the patient be successful in meeting their weight loss goals and improving their health including:   The need to limit the frequency of dining away from home, especially fast food and convenience food options, in order to limit intake of calories, fats, and carbohydrates  The importance of keeping a positive mindset by focusing on the occasion and enjoying the company rather than the foods they can't have and the need for reduced portion sizes after surgery  Avoiding starters such as appetizers, breads, bar snacks, soups, and salads  Researching to find restaurants that offer healthier food options and reviewing menus/nutrition facts online to have a plan of what to order before dining out    Class participants watched a video of a former patient's success story, where personal accountability and encouragement to make the appropriate lifestyle changes for improved of quality of life were discussed. Goals: Work to increase to 3-4 small meals per day, with 1-2 planned snacks as needed. Recommend following the plate method for meal planning - focusing on lean protein, non-starchy vegetables, and measured amounts of complex carbohydrates. - Goal of  g protein and <100 g carbohydrates per day. - Select at least 2 DIFFERENT protein supplements that can be used for protein supplementation to meet goals pre- and post-operatively. - Practice Carbohydrate Counting and label reading.   - Follow 3 g rule for fat and sugar. 2. Slow down meals  - Chew each bite 25-35 times. - Aim for 20-30 min/meal.  3. Increase non-caloric fluids to 64 oz per day. Eliminate caffeine, added sugar, carbonation, and straws.               - Continue to work to decrease sugar sweetened beverages - goal of calorie-free beverages only. - Must eliminate caffeine prior to surgery and avoid for ~6-8 weeks. - Practice 30:30 rule,  food and fluid intake. 4. Start activity regimen, work to increase ADLs. 5. Start Complete MVI and probiotic at least 30 days pre-op. Candidate for surgery (per RD): PENDING, Pt scheduled for nutrition education on 2/9/23.

## 2023-01-27 ENCOUNTER — VIRTUAL VISIT (OUTPATIENT)
Dept: SURGERY | Age: 38
End: 2023-01-27

## 2023-01-27 DIAGNOSIS — K43.9 ABDOMINAL WALL HERNIA: ICD-10-CM

## 2023-01-27 DIAGNOSIS — K30 FUNCTIONAL DYSPEPSIA: ICD-10-CM

## 2023-01-27 DIAGNOSIS — E66.01 MORBID OBESITY (HCC): Primary | ICD-10-CM

## 2023-01-27 DIAGNOSIS — Z78.9 USES BIRTH CONTROL: ICD-10-CM

## 2023-01-27 DIAGNOSIS — Z87.891 SMOKING HISTORY: ICD-10-CM

## 2023-01-27 DIAGNOSIS — E66.01 MORBID OBESITY WITH BODY MASS INDEX (BMI) OF 40.0 TO 49.9 (HCC): ICD-10-CM

## 2023-01-27 NOTE — PATIENT INSTRUCTIONS

## 2023-01-27 NOTE — PROGRESS NOTES
Bariatric Surgery Preoperative Progress Note    Subjective:     Pastor Wilson is a 40 y.o. obese female with a There is no height or weight on file to calculate BMI. .  she desires surgery at this time because of health issues and quality of life issues. Pastor Wilson has been seen by a bariatric nutritionist and has been placed on an appropriate low carbohydrate diet. The patient desires laparoscopic sleeve gastrectomy for surgical weight loss, however she is here today to review their workup to date. Pastor Wilson is here also today to check progress with weight loss / evaluate nutritional status and review all subspecialty clearances in hopes of proceeding to the operating room. Patient Active Problem List    Diagnosis Date Noted    Morbid obesity ([facility] [address] 2022    Uses birth control 2022    Adult acne 2022    Functional dyspepsia 2022    Sciatica of left side 2022    Abdominal wall hernia 2022    Smoking history 2022    Morbid obesity with body mass index (BMI) of 40.0 to 49.9 ([facility] [address] 2022      Past Surgical History:   Procedure Laterality Date    HX LEEP PROCEDURE        Social History     Tobacco Use    Smoking status: Former     Types: Cigarettes     Quit date:      Years since quittin.0    Smokeless tobacco: Never   Substance Use Topics    Alcohol use: Yes     Comment: occasional glass of wine      Family History   Problem Relation Age of Onset    Hypertension Mother     No Known Problems Father     Diabetes Paternal Grandfather       Current Outpatient Medications   Medication Sig Dispense Refill    ketoconazole (NIZORAL) 2 % shampoo use as directed three times a week      Lo Loestrin Fe 1 mg-10 mcg (24)/10 mcg (2) tab Take 1 Tablet by mouth daily. spironolactone (ALDACTONE) 100 mg tablet Take 100 mg by mouth in the morning. ibuprofen (MOTRIN) 800 mg tablet Take 1 Tab by mouth every eight (8) hours as needed for Pain.  30 Tab 0 multivitamin (ONE A DAY) tablet Take 1 Tab by mouth daily. cholecalciferol, vitamin D3, 50 mcg (2,000 unit) tab Take  by mouth daily. loratadine (CLARITIN) 10 mg tablet Take 10 mg by mouth daily. No Known Allergies       Review of Systems:            General - No history or complaints of unexpected fever, chills, or weight loss  Head/Neck - No history or complaints of headache, diplopia, dysphagia, hearing loss  Cardiac - No history or complaints of chest pain, palpitations, murmur, or shortness of breath  Pulmonary - No history or complaints of shortness of breath, productive cough, hemoptysis  Gastrointestinal - No history or complaints of reflux,  abdominal pain, obstipation/constipation, blood per rectum  Genitourinary - No history or complaints of hematuria/dysuria, stress urinary incontinence symptoms, or renal lithiasis  Musculoskeletal - No history or complaints of joint pain or muscular weakness  Hematologic - No history or complaints of bleeding disorders, blood transfusions, sickle cell anemia  Neurologic - No history or complaints of  migraine headaches, seizure activity, syncopal episodes, TIA or stroke  Integumentary - No history or complaints of rashes, abnormal nevi, skin cancer  Gynecological - spotting only           Objective:   Ht- 64in     Wt- 238lbs     BMI- 41.4    Physical Examination: General appearance - alert, well appearing, and in no distress and oriented to person, place, and time  Mental status - alert, oriented to person, place, and time, normal mood, behavior, speech, dress, motor activity, and thought processes  Neck - normal appearance  Abdomen- normal except for hernia noted   Back exam - full range of motion, no tenderness, palpable spasm or pain on motion  Neurological - alert, oriented, normal speech, no focal findings or movement disorder noted    Labs:     No results found for this or any previous visit (from the past 2016 hour(s)).         Assessment: Morbid obesity with associated comorbidity     Plan:     Continuation of Pre-Operative evaluation / clearance. Yang Chou has returned to the office today to discuss her status as a surgical candidate.    her progress has been noted and reviewed. We will continue the pre-operative process and work towards goals as outlined. she has 0 more pounds to lose before proceeding to the OR. (20 pounds lost since last visit)  she has 0 more nutritional visits to complete before proceeding to the OR  she has no clearance to review before proceeding to the OR. Yang Chou understand the rationales for all the above. It has been discussed that given her   condition   that the best surgical option for this patient would be the laparoscopic sleeve gastrectomy. Yang Chou   agrees with the surgical choice and has been educated in it's; risks, benefits, and alternatives. We will continue   with the pre-operative evaluation as needed to check progress.     Secondary Diagnoses:         Signed By: Manny Rascon MD     January 27, 2023

## 2023-02-09 ENCOUNTER — HOSPITAL ENCOUNTER (OUTPATIENT)
Dept: BARIATRICS/WEIGHT MGMT | Age: 38
Discharge: HOME | End: 2023-02-09

## 2023-02-09 VITALS — BODY MASS INDEX: 39.32 KG/M2 | HEIGHT: 64 IN | WEIGHT: 230.3 LBS

## 2023-02-09 NOTE — PROGRESS NOTES
Medical Weight Loss Multi-Disciplinary Program    Patient's Name: Scarlett Hawley Age: 40 y.o. YOB: 1985 Sex: female     Session #6. Pt attended in-person class. Weight obtained in office. Date: 2/9/2023    Visit Vitals  Jozef 5' 4\" (1.626 m)   Wt 104.5 kg (230 lb 4.8 oz)   BMI 39.53 kg/m²       Pounds Lost since last month: 3.9 lbs Pounds Gained since last month: 0 lbs       Starting Weight: 259.9 lbs Previous Months Weight: 234.2 lbs   Overall Pounds Lost: 29.6 lbs  Overall Pounds Gained: 0 lbs       Do you smoke? no    Alcohol intake:  Number of drinks per week:  1/month    Class Guidelines    Guidelines are reviewed with patient at the start of every class. 1. Patient understands that weight loss trial classes must be consecutive. Patient understands if they miss a class, it is their responsibility to contact me to reschedule class. I will reach out to patient after their first no show. 2.  Patient understands the expectations that weight maintenance/weight loss is expected during the classes. Failure to demonstrate changes may result in extension of weight loss trial, followed by returning to see the surgeon. Patient understands that they CANNOT gain any weight during the weight loss trial.  Gaining weight will result in extension of weight loss trial.  3. Patient is also instructed to complete their labwork, psychological evaluation visit, and any other tests that the surgeon has ordered while they are working on their weight loss trial.  4.  Patient was instructed to bring their packet of nutrition education materials to every class and appointment. Eating Habits and Behaviors    Reviewed intake  Understanding low carbohydrates, low sugar, higher protein meals  Instruction given for personal dietary changes  Discussed perceived compliance    Comments: RD Reviewed Diet History and Physical Activity/Exercise habits.   Recommended dietary changes discussed for both before and after surgery. Today in class we reviewed the Key Diet Principles. Patient was encouraged to consume 3 meals each day, and meal timing was reviewed. Meal time behaviors that will help pt to be successful with their weight loss efforts were also discussed. We discussed the importance of drinking adequate amounts of fluids, recommending that patient consume a minimum of 64 oz of sugar-free, caffeine-free and carbonation-free fluids each day. Patient was encouraged to eliminate sugar-sweetened beverages such as sweet tea, fruit juice, fruit smoothies, flavored coffee drinks and regular sodas. During the weight loss trial, patient is encouraged to focus on eating protein-forward meals, with a daily goal of  grams protein. Patient was also advised to decrease carbohydrate intake to <100 g/d, choosing complex vs. simple carbohydrates in limited amounts. We also discussed limiting fat intake. Encouraged patient to follow the \"3-gram rule\" when choosing foods by looking for items containing <3 g of fat and sugar per serving. We reviewed meal planning guidelines and discussed appropriate meal and snack options. We also talked about appropriate protein drinks and patient was encouraged to start trying these, using them either for a meal replacement or a protein-rich snack pre-operatively. We reviewed the nutritional guidelines for selecting protein shakes. Pre-operative vitamin and mineral supplementation was reviewed. Patient was instructed to choose a chewable complete multivitamin with iron in NON-gummy form. Selection of probiotic was also reviewed. Comments: During today's class we talked about setting SMART goals. Patient was instructed on: The Stages of Readiness to Change  Goals for each stage of readiness to change. Key words associated with each stage of readiness to change.   Examples of aspects of pts diet to focus on and how to tailor the patients goals in these areas. Patient was given the guidelines of how to set SMART goals. Pt was given examples of barriers to change and ways to persist in the face of barriers. Patient's current diet habits include:  Patient is eating 3 meals and 2 snacks per day. Per dietary recall, pts diet has the following concerns:  None noted  Pt has found 2 protein supplement shakes for use post-op in meeting their protein needs. Patient's plan for dietary and/or behavior changes in the upcoming month: none noted    Physical Activity/Exercise    Comments: We talked about exercise. Patient was given reasons of why exercise is so important and how that can help with their long-term success. I have encouraged patient to get a support system to help with the activity. We talked about recommended types of physical activity, including walking, swimming, cycling, or chair exercises. I also talked with patient about doing some strength training, which helps the metabolism, as well as strengthen muscles and bones. Patient's plan to incorporate more activity includes: \"increasing my level of exercise, walking more frequently throughout the week, increasing weights\". Behavior Modification     Comments:  During today's class, I discussed vitamin and mineral supplementation essential for health and prevention of malnutrition in depth. Patient was directed to the handouts on vitamins and minerals found on pages 27-33 that were provided in class handouts packet as well as a handout titled, \"Nutrient Deficiency and it's symptoms\". I reviewed the vitamins and minerals that need to be supplemented, dosage recommendations, functions of supplements and signs of deficiencies, as well as examples of specific supplements.   Reviewed briefly the requirement  differences between laparoscopic gastric bypass, laparoscopic sleeve gastrectomy, and lap-band procedures, while encouraging all patients to continue supplements for life no matter what bariatric surgery they are preparing for. Goals: Work to increase to 3-4 small meals per day, with 1-2 planned snacks as needed. Recommend following the plate method for meal planning - focusing on lean protein, non-starchy vegetables, and measured amounts of complex carbohydrates. - Goal of  g protein and <100 g carbohydrates per day. - Select at least 2 DIFFERENT protein supplements that can be used for protein supplementation to meet goals pre- and post-operatively. - Practice Carbohydrate Counting and label reading.   - Follow 3 g rule for fat and sugar. 2. Slow down meals  - Chew each bite 25-35 times. - Aim for 20-30 min/meal.  3. Increase non-caloric fluids to 64 oz per day. Eliminate caffeine, added sugar, carbonation, and straws.               - Continue to work to decrease sugar sweetened beverages - goal of calorie-free beverages only. - Must eliminate caffeine prior to surgery and avoid for ~6-8 weeks. - Practice 30:30 rule,  food and fluid intake. 4. Start activity regimen, work to increase ADLs. 5. Start Complete MVI and probiotic at least 30 days pre-op. Candidate for surgery (per RD): YES - Pt acknowledges understanding that bariatric surgery requires lifelong adherence to dietary and exercise behavior change recommendations in order to be successful with weight loss and long-term maintenance once weight loss goal is met. Pt demonstrates understanding of post-op key diet principles and recommendations through recall and class discussion. Pt obtained a passing score of at least 80% on bariatric surgery nutrition quiz. Patient is aware that they will be attending pre-op class 2 weeks before surgery and will get more detailed information on the post-op diet guidelines. Patient will have a telephone nutrition consult at 2 weeks post-op, where RD will assess patient's liquid diet tolerance and readiness to advance to soft/pureed diet. Pt will also have a follow-up telephone nutrition consult at 1 mo. post op, where RD will assess how patient is tolerating soft/puree protein diet, as well as compliance with supplement regimen and physical activity level. If pt is tolerating current diet without difficulty, diet will be advanced to add non-starchy vegetables and other appropriate foods, and post-op diet guidelines will be reinforced. At this appointment, RD will also add required post-op vitamins/minerals, per protocol, to pts current regimen. Pt does not have any questions/concerns at this time. RD contact information provided RD is available to meet with patient pre- and post-operatively, as needed, for any nutrition-related questions or concerns.

## 2023-03-02 DIAGNOSIS — Z01.812 BLOOD TESTS PRIOR TO TREATMENT OR PROCEDURE: ICD-10-CM

## 2023-03-02 DIAGNOSIS — E66.01 MORBID OBESITY (HCC): Primary | ICD-10-CM

## 2023-03-02 DIAGNOSIS — E66.01 MORBID OBESITY WITH BODY MASS INDEX (BMI) OF 40.0 TO 49.9 (HCC): ICD-10-CM
